# Patient Record
Sex: FEMALE | Race: WHITE | URBAN - METROPOLITAN AREA
[De-identification: names, ages, dates, MRNs, and addresses within clinical notes are randomized per-mention and may not be internally consistent; named-entity substitution may affect disease eponyms.]

---

## 2021-08-28 ENCOUNTER — TELEPHONE ENCOUNTER (OUTPATIENT)
Dept: URBAN - METROPOLITAN AREA CLINIC 13 | Facility: CLINIC | Age: 41
End: 2021-08-28

## 2021-08-29 ENCOUNTER — TELEPHONE ENCOUNTER (OUTPATIENT)
Dept: URBAN - METROPOLITAN AREA CLINIC 13 | Facility: CLINIC | Age: 41
End: 2021-08-29

## 2021-08-29 RX ORDER — LANSOPRAZOLE 30 MG/1
CAPSULE, DELAYED RELEASE ORAL
Status: ACTIVE | COMMUNITY
Start: 2018-07-30

## 2024-07-31 ENCOUNTER — DASHBOARD ENCOUNTERS (OUTPATIENT)
Age: 44
End: 2024-07-31

## 2024-07-31 PROBLEM — 453861000124107: Status: ACTIVE | Noted: 2024-07-31

## 2024-07-31 PROBLEM — 127026004: Status: ACTIVE | Noted: 2024-07-31

## 2024-07-31 PROBLEM — 443913008: Status: ACTIVE | Noted: 2024-07-31

## 2024-07-31 PROBLEM — 37429009: Status: ACTIVE | Noted: 2024-07-31

## 2024-07-31 PROBLEM — 429281008: Status: ACTIVE | Noted: 2024-07-31

## 2024-07-31 PROBLEM — 424263008: Status: ACTIVE | Noted: 2024-07-31

## 2024-08-01 ENCOUNTER — LAB OUTSIDE AN ENCOUNTER (OUTPATIENT)
Dept: URBAN - METROPOLITAN AREA CLINIC 86 | Facility: CLINIC | Age: 44
End: 2024-08-01

## 2024-08-01 ENCOUNTER — OFFICE VISIT (OUTPATIENT)
Dept: URBAN - METROPOLITAN AREA CLINIC 86 | Facility: CLINIC | Age: 44
End: 2024-08-01
Payer: COMMERCIAL

## 2024-08-01 VITALS
HEIGHT: 60 IN | SYSTOLIC BLOOD PRESSURE: 122 MMHG | WEIGHT: 145 LBS | BODY MASS INDEX: 28.47 KG/M2 | DIASTOLIC BLOOD PRESSURE: 73 MMHG | TEMPERATURE: 97.2 F | HEART RATE: 82 BPM

## 2024-08-01 DIAGNOSIS — Z79.899 HIGH RISK MEDICATION USE: ICD-10-CM

## 2024-08-01 DIAGNOSIS — D49.7 PINEAL TUMOR: ICD-10-CM

## 2024-08-01 DIAGNOSIS — D13.4 HEPATIC ADENOMA: ICD-10-CM

## 2024-08-01 DIAGNOSIS — Z90.49 HISTORY OF RESECTION OF LIVER: ICD-10-CM

## 2024-08-01 PROBLEM — 238131007: Status: ACTIVE | Noted: 2024-08-01

## 2024-08-01 PROCEDURE — 99205 OFFICE O/P NEW HI 60 MIN: CPT

## 2024-08-01 PROCEDURE — 99245 OFF/OP CONSLTJ NEW/EST HI 55: CPT

## 2024-08-01 RX ORDER — LANSOPRAZOLE 30 MG/1
CAPSULE, DELAYED RELEASE ORAL
Status: ACTIVE | COMMUNITY
Start: 2018-07-30

## 2024-08-01 RX ORDER — LEVOTHYROXINE SODIUM 50 UG/1
1 TABLET IN THE MORNING ON AN EMPTY STOMACH CAPSULE ORAL ONCE A DAY
Status: ACTIVE | COMMUNITY

## 2024-08-01 RX ORDER — LIOTHYRONINE SODIUM 5 UG/1
1 TABLET ON AN EMPTY STOMACH TABLET ORAL ONCE A DAY
Status: ACTIVE | COMMUNITY

## 2024-08-01 RX ORDER — DOCUSATE SODIUM 100 MG/1
1 CAPSULE AS NEEDED CAPSULE, LIQUID FILLED ORAL ONCE A DAY
Status: ON HOLD | COMMUNITY

## 2024-08-01 RX ORDER — SUMATRIPTAN SUCCINATE 25 MG/1
1 TABLET AT LEAST 2 HOURS BETWEEN DOSES AS NEEDED TABLET, FILM COATED ORAL TWICE A DAY
Status: ACTIVE | COMMUNITY

## 2024-08-01 RX ORDER — SOMATROPIN 6 MG
AS DIRECTED KIT INTRAMUSCULAR; SUBCUTANEOUS
Status: ACTIVE | COMMUNITY

## 2024-08-01 RX ORDER — MONTELUKAST SODIUM 10 MG/1
1 TABLET TABLET, FILM COATED ORAL ONCE A DAY
Status: ACTIVE | COMMUNITY

## 2024-08-01 NOTE — EXAM-PHYSICAL EXAM
Gen: awake and responsive.  Eyes: anicteric, normal lids.  Mouth: mask on  Nose: maskon  Hearing: intact grossly.  Neck: trachea midline and no jvd.  CV: RRR no s3.  Lungs: clear. No wheezes.  Abd: Soft, nabs, nr,NT. No hsm.  Ext: no sig edema,some palm erythema.  Neuro: moves all 4 ext

## 2024-08-01 NOTE — HPI-TODAY'S VISIT:
Patient is a 43-year-old female coming in being referred in by Evelyne Freire for evaluation of liver issues.  A copy the note be sent to referring providers.  Pt was prior being seen by [facility] and when went back to follow up told was not planning tx and she lost to follow up and not clear of options.  Told that they the [facility] office was not seeing her and the plan follow with iR team and be referred back then.  That mayhave led to the misinetrpretation.  Recap:   Patient listed as having been seen in the past at [place] for history of liver adenomas, hypothyroidism, and anterior pituitary disorders.  Patient listed as having had a pineal gland resection 1993, left lateral segment 3 on October 2018, partial right hepatectomy at that same time as well as cholecystectomy. Embolization of the liver done on August 13, 2021.  No tx since and has done scans with er as needed and if has had a gi illness ad hospital admits.  Last scan was June 6 2023 [facility] in [place], [place]:  postop changes in the region of the left lobe and 1 cm hypodense lesion with peripheral nodular enhancement in the posterior right los of the liver and hemangioma and additional subcentimter hypodensitiy is right lobe of the liver which is too small to characterize and 2.5 x1.5cm hypodense collectin in the subhepatic spce and gb absent.  Subcentimeter hypodensity right kidney and too small to characterize and pancreas spleen and adrenals and left kidney unremarkale.  Tickenign vs underdistension of the stomach, Mulltiple colonic diverticula without diverticulitis.  They recomeded to hepatic protocol  Medicines for hormonal therapy. Some allergy meds. prevacid.  Allergies to metformin, Dilantin, glutens, Macrobid, Trileptal, shellfish, ankle.  Social history not a smoker not a drinker.  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  She has hormone deficiencies and unfortunately has a history of hepatic adenomas.  She has been seen in 2021 for follow-up as she had a history of left lateral segmentectomy as well as partial right lobe resection and cholecystectomy at [facility] 2018 or 2019.  Plan embolization on August 13, 2021.  Labs at that timeframe showed a potassium of 4.3, glucose 91, BUN of 14 creatinine 0.95 albumin 4.0 bilirubin 0.3 AST was 15 ALT 14 alk phos 124 WBC 9.2 hemoglobin 13.5 plate count 285 INR 1.0.  They felt at that point present when Zhanna CAR saw her that intervention radiology reviewed her scan and they plan to see if follow-up imaging in 6 months and reassess her.  It does not appear that that occurred.  We also saw a note from [place] liver tumor conference from Dr. Gomez who saw her back on September 24, 2021 at a conference and they had an MRI from September 21, 2021 that they reviewed with the patient having a post embolic change of segment 7 adenomas which remained stable or slightly decreased in size with stable scattered other arterial enhancing nodules  Patient is known diagnosis of adenomas.  The overall close she had a good response to segment 7 and a plan embolization with internal close of adenomatous and they recommended surveillance again in 6 months from that conference.  The MRI dated September 21, 2021 was also reviewed.  FINDINGS:   Lower Thorax: Normal.  Liver: Fat deposition. Redemonstrated postoperative changes affecting the left  hepatic lobe. Redemonstrated diffuse peripherally enhancing hepatic nodular  lesions with T2 intermediate signal and no restricted diffusion for example:  Dominant Segment 7, peripherally enhancing lesion measuring 3.4 x 3.4 cm (13:39)  compared to 3.9 x 4.6 cm in previous study.  Segment 7/8 peripherally enhancing lesion measuring 1.1 x 1.1 cm compared to 2.0  x 2.0 cm on previous study  Segment 8, peripherally enhancing 2.2 x 2.5 cm lesion in the hepatic dome  compared to 2.3 x 2.3 cm in previous study (11:21).  The above lesions show evidence of central necrosis and some blood products in  the superior lesion, probably postembolization changes. Other lesions display  more homogenous arterial enhancement as they did on prior study.  Gallbladder/Biliary Tree: Gallbladder not seen. No intra or extrahepatic biliary  duct dilation  Spleen: Normal.  Pancreas: Normal.  Adrenal Glands: Normal.   Kidneys/Ureters: Renal cysts.  Gastrointestinal: Normal.   Lymph Nodes: Normal.  Vessels: Normal.  Peritoneum/Retroperitoneum: Normal.  Bones/Soft Tissues: Postsurgical changes from hepatic resection in the midline  abdominal wall.  IMPRESSION:      1.  Post embolic changes of segment 7 adenomas which remain stable or are  slightly decreased in size. Stable scattered other arterially enhancing nodules  compatible with patient's known diagnosis of adenomas.  2.  Hepatic steatosis  The images were reviewed and interpreted by Mary Espinal MD   Plan  1.  Patient needs labs CBC CMP PT/INR now to see how doing. 2.  Patient needs MRI eovist ASAP to assess her liver status and get the IR to review once that is done. 3.  Will ask interventional radiology to follow-up with patient as she has lost to follow-up with them once has the mri. 4. Key is she needs to avoid oral contraceptives and be monitored as she approaches the menopausal state with her other hormonal issues.  Duration of visit was 80 minutes with 40 minutes spent prepping chart and loading info for the visit to [facility] records and then additional 40 minutes spent for this face to face visit reviewing chart and events and plans with the pt and family.

## 2024-08-03 ENCOUNTER — TELEPHONE ENCOUNTER (OUTPATIENT)
Dept: URBAN - METROPOLITAN AREA CLINIC 86 | Facility: CLINIC | Age: 44
End: 2024-08-03

## 2024-08-03 LAB
A/G RATIO: 1.6
ABSOLUTE BASOPHILS: 84
ABSOLUTE EOSINOPHILS: 410
ABSOLUTE LYMPHOCYTES: 2409
ABSOLUTE MONOCYTES: 464
ABSOLUTE NEUTROPHILS: 4233
ALBUMIN: 4.2
ALKALINE PHOSPHATASE: 101
ALT (SGPT): 11
AST (SGOT): 11
BASOPHILS: 1.1
BILIRUBIN, TOTAL: 0.3
BUN/CREATININE RATIO: (no result)
BUN: 13
CALCIUM: 9.7
CARBON DIOXIDE, TOTAL: 23
CHLORIDE: 102
CREATININE: 0.91
EGFR: 80
EOSINOPHILS: 5.4
GLOBULIN, TOTAL: 2.6
GLUCOSE: 82
HEMATOCRIT: 45.3
HEMOGLOBIN: 14.4
HEPATITIS A AB, TOTAL: (no result)
HEPATITIS B CORE AB TOTAL: (no result)
HEPATITIS B SURFACE AB IMMUNITY, QN: <5
HEPATITIS B SURFACE ANTIGEN: (no result)
HEPATITIS C ANTIBODY: (no result)
INR: 0.9
LYMPHOCYTES: 31.7
MCH: 28.1
MCHC: 31.8
MCV: 88.3
MONOCYTES: 6.1
MPV: 10.7
NEUTROPHILS: 55.7
PLATELET COUNT: 287
POTASSIUM: 4.4
PROTEIN, TOTAL: 6.8
PT: 9.8
RDW: 13.1
RED BLOOD CELL COUNT: 5.13
SODIUM: 138
WHITE BLOOD CELL COUNT: 7.6

## 2024-08-03 NOTE — HPI-TODAY'S VISIT:
Dear Jasmin Foster, Aug 1 and hepatitis B surface ab less than 5 and not immune and need to look at vaccine options to get protected for this. Glucose 82 and bun 13 and cr 0.91 and na 138 and k 4.4 and cl 102 and co23 and ca 9.7 and total protein 6.8 and albumin 4.2 and tb 0.3 and alk 101 and ast 11 and alt 11. Wbc 7.6 and hg 14.4 and hct 45.3 which was a little up. Mcv 88.3 normal. Platelets 287 normal. RBC little up 5.13. Wonder if due to being a little on "dry" side that day? Neutrophils 4233 and lymphs 2409.  Inr 0.9 and Hepatitis b core ab total not reactive. Hepatitis A immunity not seen and could consider getting hepatitis A and B vaccine series to get immunity against both. please review with primary. Hepatitis B surface antigen is not reactive. Hepatitis C ab is not reactive and good to confirm. Good to see labs are stable for you. We await the mri now and we will then ask IR to follow up. Dr East

## 2024-09-16 ENCOUNTER — TELEPHONE ENCOUNTER (OUTPATIENT)
Dept: URBAN - METROPOLITAN AREA CLINIC 86 | Facility: CLINIC | Age: 44
End: 2024-09-16

## 2024-09-16 NOTE — HPI-TODAY'S VISIT:
Dear Jasmin Foster,  September 16 MRI back.  They compared it to your September 2021 MRI.  Lower thorax appeared normal.  They saw post surgical changes from your left hepatic segmentectomy and partial right hepatic lobe resection that  were stable.  Unchanged scarring also seen along the right inferior hepatic convexity.  They saw post embolic changes within segment 7 of the liver with interval decrease in size and partial collapse of the treatment cavity of the treated lesions in segment 7 which now measures 1.7 x 1.4 cm and previously 3.1 x 3.0 cm.   Additional stable to slightly increase scattered lesions were seen without the hepatic parenchyma which demonstrate similar enhancement without evidence of washout or definite fat signal in the in and out of phase imaging.  For example, in segment 4-a Neupogen of 2.3 x 2.3 cm lesion that was previously 2.1 x 1.7 in segment 5 due to 0.7 cm lesion previously 0.4.  Prior cholecystectomy changes were seen.  The spleen, pancreas, and adrenal glands were normal.  Kidney showed unchanged subcentimeter right renal cyst with no hydronephrosis.  Colon diverticulosis was seen without inflammation.  No evidence of any bowel obstruction.  No pathologic enlarged lymph nodes were seen.  Portal vein was patent.  Partially seen ventriculoperitoneal shunt was seen.  Trace ascites versus CSF was seen.  In summary, they were finding consistent with the known hepatic adenomas with a background of hepatic fatty liver changes.  There was mild interval increase in size of a few hepatic adenomas but no definite new lesions.  They continues to see evolving post embolic changes in segment 7 with interval decrease in size of multiple treated lesions and partial collapse of treatment cavities several which demonstrate some mild persistent peripheral enhancement.  It would be important to do the scan again in 6 months.  We will plan to follow along with the IR team and with your primary and see how these do over time.  We will discuss this again at your October 4 visit.  Dr. East

## 2024-09-19 ENCOUNTER — OFFICE VISIT (OUTPATIENT)
Dept: URBAN - METROPOLITAN AREA CLINIC 86 | Facility: CLINIC | Age: 44
End: 2024-09-19

## 2024-11-26 ENCOUNTER — WEB ENCOUNTER (OUTPATIENT)
Dept: URBAN - METROPOLITAN AREA CLINIC 86 | Facility: CLINIC | Age: 44
End: 2024-11-26

## 2024-12-04 ENCOUNTER — OFFICE VISIT (OUTPATIENT)
Dept: URBAN - METROPOLITAN AREA CLINIC 86 | Facility: CLINIC | Age: 44
End: 2024-12-04
Payer: SELF-PAY

## 2024-12-04 VITALS
BODY MASS INDEX: 27.88 KG/M2 | SYSTOLIC BLOOD PRESSURE: 123 MMHG | HEART RATE: 89 BPM | HEIGHT: 60 IN | WEIGHT: 142 LBS | TEMPERATURE: 97.2 F | DIASTOLIC BLOOD PRESSURE: 88 MMHG

## 2024-12-04 DIAGNOSIS — E66.3 OVERWEIGHT: ICD-10-CM

## 2024-12-04 DIAGNOSIS — E03.8 HYPOTHALAMIC HYPOTHYROIDISM: ICD-10-CM

## 2024-12-04 DIAGNOSIS — Z90.49 HISTORY OF RESECTION OF LIVER: ICD-10-CM

## 2024-12-04 DIAGNOSIS — D13.4 HEPATIC ADENOMA: ICD-10-CM

## 2024-12-04 DIAGNOSIS — Z71.85 VACCINE COUNSELING: ICD-10-CM

## 2024-12-04 DIAGNOSIS — Z79.899 HIGH RISK MEDICATION USE: ICD-10-CM

## 2024-12-04 DIAGNOSIS — D49.7 PINEAL TUMOR: ICD-10-CM

## 2024-12-04 PROCEDURE — 99215 OFFICE O/P EST HI 40 MIN: CPT

## 2024-12-04 RX ORDER — LANSOPRAZOLE 30 MG/1
CAPSULE, DELAYED RELEASE ORAL
Status: ACTIVE | COMMUNITY
Start: 2018-07-30

## 2024-12-04 RX ORDER — SOMATROPIN 6 MG
AS DIRECTED KIT INTRAMUSCULAR; SUBCUTANEOUS
Status: ACTIVE | COMMUNITY

## 2024-12-04 RX ORDER — SUMATRIPTAN SUCCINATE 25 MG/1
1 TABLET AT LEAST 2 HOURS BETWEEN DOSES AS NEEDED TABLET, FILM COATED ORAL TWICE A DAY
Status: ACTIVE | COMMUNITY

## 2024-12-04 RX ORDER — LEVOTHYROXINE SODIUM 50 UG/1
1 TABLET IN THE MORNING ON AN EMPTY STOMACH CAPSULE ORAL ONCE A DAY
Status: ACTIVE | COMMUNITY

## 2024-12-04 RX ORDER — DOCUSATE SODIUM 100 MG/1
1 CAPSULE AS NEEDED CAPSULE, LIQUID FILLED ORAL ONCE A DAY
Status: ON HOLD | COMMUNITY

## 2024-12-04 RX ORDER — ESTRADIOL 0.1 MG/G
AS DIRECTED CREAM VAGINAL
Status: ACTIVE | COMMUNITY

## 2024-12-04 RX ORDER — MONTELUKAST SODIUM 10 MG/1
1 TABLET TABLET, FILM COATED ORAL ONCE A DAY
Status: ACTIVE | COMMUNITY

## 2024-12-04 RX ORDER — LIOTHYRONINE SODIUM 5 UG/1
1 TABLET ON AN EMPTY STOMACH TABLET ORAL ONCE A DAY
Status: ACTIVE | COMMUNITY

## 2024-12-04 NOTE — HPI-TODAY'S VISIT:
Patient is a 44-year-old female seen aug 2024 and now coming in being referred in by Evelyne Freire for evaluation of liver issues.  A copy the note be sent to referring providers.  September 16 MRI back. They compared it to your September 2021 MRI. Lower thorax appeared normal. They saw post surgical changes from your left hepatic segmentectomy and partial right hepatic lobe resection that  were stable.  Unchanged scarring also seen along the right inferior hepatic convexity. They saw post embolic changes within segment 7 of the liver with interval decrease in size and partial collapse of the treatment cavity of the treated lesions in segment 7 which now measures 1.7 x 1.4 cm and previously 3.1 x 3.0 cm.  Additional stable to slightly increase scattered lesions were seen without the hepatic parenchyma which demonstrate similar enhancement without evidence of washout or definite fat signal in the in and out of phase imaging.  For example, in segment 4-a Neupogen of 2.3 x 2.3 cm lesion that was previously 2.1 x 1.7 in segment 5 due to 0.7 cm lesion previously 0.4.  Prior cholecystectomy changes were seen.  The spleen, pancreas, and adrenal glands were normal.  Kidney showed unchanged subcentimeter right renal cyst with no hydronephrosis.  Colon diverticulosis was seen without inflammation.  No evidence of any bowel obstruction.  No pathologic enlarged lymph nodes were seen.  Portal vein was patent.  Partially seen ventriculoperitoneal shunt was seen.  Trace ascites versus CSF was seen.  In summary, they were finding consistent with the known hepatic adenomas with a background of hepatic fatty liver changes.  There was mild interval increase in size of a few hepatic adenomas but no definite new lesions.  They continues to see evolving post embolic changes in segment 7 with interval decrease in size of multiple treated lesions and partial collapse of treatment cavities several which demonstrate some mild persistent peripheral enhancement.  It would be important to do the scan again in 6 months.  We will plan to follow along with the IR team and with your primary and see how these do over time.  We need to set up for the mri in march.   Aug 1 and hepatitis B surface ab less than 5 and not immune and need to look at vaccine options to get protected for this. Glucose 82 and bun 13 and cr 0.91 and na 138 and k 4.4 and cl 102 and co23 and ca 9.7 and total protein 6.8 and albumin 4.2 and tb 0.3 and alk 101 and ast 11 and alt 11. Wbc 7.6 and hg 14.4 and hct 45.3 which was a little up. Mcv 88.3 normal. Platelets 287 normal. RBC little up 5.13. Wonder if due to being a little on "dry" side that day? Neutrophils 4233 and lymphs 2409. Inr 0.9 and Hepatitis b core ab total not reactive. Hepatitis A immunity not seen and could consider getting hepatitis A and B vaccine series to get immunity against both. please review with primary. Hepatitis B surface antigen is not reactive. Hepatitis C ab is not reactive and good to confirm.  Good to see labs are stable for you. We await the mri now and we will then ask IR to follow up.  Pt was prior being seen by [facility] and when went back to follow up told was not planning tx and she lost to follow up and not clear of options.  Told that they the [facility] office was not seeing her and the plan follow with iR team and be referred back then.  That mayhave led to the misinetrpretation.  Recap:   Patient listed as having been seen in the past at [facility] for history of liver adenomas, hypothyroidism, and anterior pituitary disorders.  Patient listed as having had a pineal gland resection 1993, left lateral segment 3 on October 2018, partial right hepatectomy at that same time as well as cholecystectomy. Embolization of the liver done on August 13, 2021.  No tx since and has done scans with er as needed and if has had a gi illness ad hospital admits.  Last scan was June 6 2023 [facility] in [place], [place]:  postop changes in the region of the left lobe and 1 cm hypodense lesion with peripheral nodular enhancement in the posterior right los of the liver and hemangioma and additional subcentimter hypodensitiy is right lobe of the liver which is too small to characterize and 2.5 x1.5cm hypodense collectin in the subhepatic spce and gb absent.  Subcentimeter hypodensity right kidney and too small to characterize and pancreas spleen and adrenals and left kidney unremarkale.  Tickenign vs underdistension of the stomach, Mulltiple colonic diverticula without diverticulitis.  They recomeded to hepatic protocol  Medicines for hormonal therapy. Some allergy meds. prevacid.  Allergies to metformin, Dilantin, glutens, Macrobid, Trileptal, shellfish, ankle.  Social history not a smoker not a drinker.  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  She has hormone deficiencies and unfortunately has a history of hepatic adenomas.  She has been seen in 2021 for follow-up as she had a history of left lateral segmentectomy as well as partial right lobe resection and cholecystectomy at [facility] 2018 or 2019.  Plan embolization on August 13, 2021.  Labs at that timeframe showed a potassium of 4.3, glucose 91, BUN of 14 creatinine 0.95 albumin 4.0 bilirubin 0.3 AST was 15 ALT 14 alk phos 124 WBC 9.2 hemoglobin 13.5 plate count 285 INR 1.0.  They felt at that point present when Zhanna CAR saw her that intervention radiology reviewed her scan and they plan to see if follow-up imaging in 6 months and reassess her.  It does not appear that that occurred.  We also saw a note from [facility] liver tumor conference from Dr. Gomez who saw her back on September 24, 2021 at a conference and they had an MRI from September 21, 2021 that they reviewed with the patient having a post embolic change of segment 7 adenomas which remained stable or slightly decreased in size with stable scattered other arterial enhancing nodules  Patient is known diagnosis of adenomas.  The overall close she had a good response to segment 7 and a plan embolization with internal close of adenomatous and they recommended surveillance again in 6 months from that conference.  The MRI dated September 21, 2021 was also reviewed.  FINDINGS:   Lower Thorax: Normal.  Liver: Fat deposition. Redemonstrated postoperative changes affecting the left  hepatic lobe. Redemonstrated diffuse peripherally enhancing hepatic nodular  lesions with T2 intermediate signal and no restricted diffusion for example:  Dominant Segment 7, peripherally enhancing lesion measuring 3.4 x 3.4 cm (13:39)  compared to 3.9 x 4.6 cm in previous study.  Segment 7/8 peripherally enhancing lesion measuring 1.1 x 1.1 cm compared to 2.0  x 2.0 cm on previous study  Segment 8, peripherally enhancing 2.2 x 2.5 cm lesion in the hepatic dome  compared to 2.3 x 2.3 cm in previous study (11:21).  The above lesions show evidence of central necrosis and some blood products in  the superior lesion, probably postembolization changes. Other lesions display  more homogenous arterial enhancement as they did on prior study.  Gallbladder/Biliary Tree: Gallbladder not seen. No intra or extrahepatic biliary  duct dilation  Spleen: Normal.  Pancreas: Normal.  Adrenal Glands: Normal.   Kidneys/Ureters: Renal cysts.  Gastrointestinal: Normal.   Lymph Nodes: Normal.  Vessels: Normal.  Peritoneum/Retroperitoneum: Normal.  Bones/Soft Tissues: Postsurgical changes from hepatic resection in the midline  abdominal wall.  IMPRESSION:      1.  Post embolic changes of segment 7 adenomas which remain stable or are  slightly decreased in size. Stable scattered other arterially enhancing nodules  compatible with patient's known diagnosis of adenomas.  2.  Hepatic steatosis  The images were reviewed and interpreted by Mary Espinal MD  Having some headaches with the switch to cream hormone and using some imitrex for this and not a lot of data of toxicity from this.   Plan  1.  Patient needs labs in march. 2. Plan for the eovist mri in march. Switched to estradiol cream and having headaches on cream. Says may need imitrex pills to help with this. 3. If the lesions change then we can reassess. 4. Keep following and refer as needed.    Duration of visit was 53 minutes with 20 minutes spent prepping chart and loading info for the visit to [facility] records and then additional 33 minutes spent for this face to face visit reviewing chart and events and plans with the pt and family.

## 2025-01-23 ENCOUNTER — WEB ENCOUNTER (OUTPATIENT)
Dept: URBAN - METROPOLITAN AREA CLINIC 86 | Facility: CLINIC | Age: 45
End: 2025-01-23

## 2025-03-01 ENCOUNTER — LAB OUTSIDE AN ENCOUNTER (OUTPATIENT)
Dept: URBAN - METROPOLITAN AREA CLINIC 86 | Facility: CLINIC | Age: 45
End: 2025-03-01

## 2025-03-05 ENCOUNTER — LAB OUTSIDE AN ENCOUNTER (OUTPATIENT)
Dept: URBAN - METROPOLITAN AREA CLINIC 86 | Facility: CLINIC | Age: 45
End: 2025-03-05

## 2025-03-15 ENCOUNTER — TELEPHONE ENCOUNTER (OUTPATIENT)
Dept: URBAN - METROPOLITAN AREA CLINIC 86 | Facility: CLINIC | Age: 45
End: 2025-03-15

## 2025-03-15 LAB
A/G RATIO: 1.8
ABSOLUTE BASOPHILS: 88
ABSOLUTE EOSINOPHILS: 744
ABSOLUTE LYMPHOCYTES: 2032
ABSOLUTE MONOCYTES: 472
ABSOLUTE NEUTROPHILS: 4664
ALBUMIN: 4.4
ALKALINE PHOSPHATASE: 182
ALT (SGPT): 19
AST (SGOT): 18
BASOPHILS: 1.1
BILIRUBIN, TOTAL: 0.4
BUN/CREATININE RATIO: 20
BUN: 20
CALCIUM: 9.9
CARBON DIOXIDE, TOTAL: 26
CHLORIDE: 106
CREATININE: 1.01
EGFR: 70
EOSINOPHILS: 9.3
GLOBULIN, TOTAL: 2.4
GLUCOSE: 114
HEMATOCRIT: 40.5
HEMOGLOBIN: 13.3
LYMPHOCYTES: 25.4
MCH: 28.5
MCHC: 32.8
MCV: 86.9
MONOCYTES: 5.9
MPV: 11
NEUTROPHILS: 58.3
PLATELET COUNT: 265
POTASSIUM: 4.4
PROTEIN, TOTAL: 6.8
RDW: 13.1
RED BLOOD CELL COUNT: 4.66
SODIUM: 143
WHITE BLOOD CELL COUNT: 8

## 2025-03-15 NOTE — HPI-TODAY'S VISIT:
Dear Jasmin Foster,  March 14 labs show glucose elevated 114 and unclear if you were fasting for the labs?  Please share with local providers.  BUN was 20 and creatinine slightly up at 1.01 and previously 0.91.  Sodium 143 potassium 4.4 calcium 9.9 albumin 4.4 bilirubin 0.4 alk phos 182 which was elevated and previously in August of last year had been normal at 101.  AST was normal at 18 ALT of 19 with ideal ALT less than 25 but both were higher than in August 2024 when the AST was 11 ALT 11.  WBC normal at 8 hemoglobin normal at 13.3 MCV normal at 86.9 and platelet count normal at 265 normal neutrophils 4664 and lymphocytes 2032.  Monocytes normal at 472 curiously her eosinophils are up at 744 and unclear if related due to the recent pollen increases that we are all seeing causing you to have some allergies?  I remember that you notified us back in January that unfortunately you had sustained a stroke and so I wonder if you are on some new medicines or some new issues that could be affecting your labs?  Please let us know what medicines you are taking to see if there is any medicine that could be causing the side effects?  You have an appointment coming up on March 28 we will review that also with you then.  Dr. East

## 2025-03-24 ENCOUNTER — TELEPHONE ENCOUNTER (OUTPATIENT)
Dept: URBAN - METROPOLITAN AREA CLINIC 86 | Facility: CLINIC | Age: 45
End: 2025-03-24

## 2025-03-24 NOTE — HPI-TODAY'S VISIT:
Dear Jasmin Foster,   March 14 MRI was read out and released today.   Lower thorax appeared normal.   Liver had fat deposition and postsurgical changes from your left hepatic segmentectomy and partial right hepatic lobe resection.  Postembolization changes seen in segment 7 with continued decrease in size of the treatment cavity measuring 1.3 x 1.2 cm from 1.7 x 1.4 cm in September 2024.   Unchanged to slightly decreased size of multiple other lesions throughout the liver without washout which include:   -Unchanged to minimally decreased segment 4A lesion measures 2.3 x 2.1 cm (13:35), previously 2.3 x 2.3 cm.   -Decreased segment 5 lesion measuring 1.3 x 0.6 cm (13:50), previously 1.8 x 1.1 cm.   -Decreased segment 7 lesion measuring 1.6 x 0.9 cm (13:33), previously 1.3 x 1.8 cm   Gallbladder/biliary tree showed postcholecystectomy changes.   Spleen, pancreas, adrenal glands normal.   Kidneys showed some renal cysts.   Colon views showed diverticulosis but without inflammation.   Lymph nodes were normal.   Vessels showed no aneurysmal dilation and patent portal vasculature.   No aggressive osseous lesions seen.    In summary, yet stable to slightly decreased size of the multiple hepatic arterial enhancing lesions consistent with your known hepatic adenomatosis.  Your status post segment 7 embolization with decreased size of the treatment cavity noted as listed above.  Liver did appear to be fatty but they did not give a fat amount.  Important to keep working on that as well.   Dr. East

## 2025-03-28 ENCOUNTER — OFFICE VISIT (OUTPATIENT)
Dept: URBAN - METROPOLITAN AREA TELEHEALTH 2 | Facility: TELEHEALTH | Age: 45
End: 2025-03-28
Payer: SELF-PAY

## 2025-03-28 ENCOUNTER — OFFICE VISIT (OUTPATIENT)
Dept: URBAN - METROPOLITAN AREA CLINIC 86 | Facility: CLINIC | Age: 45
End: 2025-03-28

## 2025-03-28 DIAGNOSIS — D13.4 HEPATIC ADENOMA: ICD-10-CM

## 2025-03-28 DIAGNOSIS — I67.89 CVA: ICD-10-CM

## 2025-03-28 DIAGNOSIS — Z71.85 VACCINE COUNSELING: ICD-10-CM

## 2025-03-28 DIAGNOSIS — Z79.899 HIGH RISK MEDICATION USE: ICD-10-CM

## 2025-03-28 DIAGNOSIS — Q21.12 PATENT FORAMEN OVALE: ICD-10-CM

## 2025-03-28 DIAGNOSIS — E03.8 HYPOTHALAMIC HYPOTHYROIDISM: ICD-10-CM

## 2025-03-28 DIAGNOSIS — E66.3 OVERWEIGHT: ICD-10-CM

## 2025-03-28 DIAGNOSIS — Z90.49 HISTORY OF RESECTION OF LIVER: ICD-10-CM

## 2025-03-28 DIAGNOSIS — R73.09 ELEVATED GLUCOSE: ICD-10-CM

## 2025-03-28 DIAGNOSIS — K76.0 FATTY LIVER: ICD-10-CM

## 2025-03-28 DIAGNOSIS — D49.7 PINEAL TUMOR: ICD-10-CM

## 2025-03-28 PROCEDURE — 99215 OFFICE O/P EST HI 40 MIN: CPT

## 2025-03-28 RX ORDER — SUMATRIPTAN SUCCINATE 25 MG/1
1 TABLET AT LEAST 2 HOURS BETWEEN DOSES AS NEEDED TABLET, FILM COATED ORAL TWICE A DAY
Status: ACTIVE | COMMUNITY

## 2025-03-28 RX ORDER — MONTELUKAST SODIUM 10 MG/1
1 TABLET TABLET, FILM COATED ORAL ONCE A DAY
Status: ACTIVE | COMMUNITY

## 2025-03-28 RX ORDER — LIOTHYRONINE SODIUM 5 UG/1
1 TABLET ON AN EMPTY STOMACH TABLET ORAL ONCE A DAY
Status: ACTIVE | COMMUNITY

## 2025-03-28 RX ORDER — LANSOPRAZOLE 30 MG/1
CAPSULE, DELAYED RELEASE ORAL
Status: ACTIVE | COMMUNITY
Start: 2018-07-30

## 2025-03-28 RX ORDER — ESTRADIOL 0.1 MG/G
AS DIRECTED CREAM VAGINAL
Status: ACTIVE | COMMUNITY

## 2025-03-28 RX ORDER — SOMATROPIN 6 MG
AS DIRECTED KIT INTRAMUSCULAR; SUBCUTANEOUS
Status: ON HOLD | COMMUNITY

## 2025-03-28 RX ORDER — DOCUSATE SODIUM 100 MG/1
1 CAPSULE AS NEEDED CAPSULE, LIQUID FILLED ORAL ONCE A DAY
Status: ON HOLD | COMMUNITY

## 2025-03-28 RX ORDER — LEVOTHYROXINE SODIUM 50 UG/1
1 TABLET IN THE MORNING ON AN EMPTY STOMACH CAPSULE ORAL ONCE A DAY
Status: ACTIVE | COMMUNITY

## 2025-03-28 NOTE — EXAM-PHYSICAL EXAM
Gen: no distress.  Eyes: no jaundice.  Mouth: no thrush.  CV: no chest pain.  Resp: no wheezes.  Abd: no pain.  Ext: no edema.  Neuro: left hand last to catch up from cva and less so arms.
IV discontinued, cath removed intact

## 2025-03-28 NOTE — HPI-TODAY'S VISIT:
Patient is a 44-year-old female seen Dec 2024 and now coming in being referred in by Evelyne Freire for evaluation of liver issues.  A copy the note be sent to referring providers.  March 14 MRI Lower thorax appeared normal. Liver had fat deposition and postsurgical changes from your left hepatic segmentectomy and partial right hepatic lobe resection.  Postembolization changes seen in segment 7 with continued decrease in size of the treatment cavity measuring 1.3 x 1.2 cm from 1.7 x 1.4 cm in September 2024. Unchanged to slightly decreased size of multiple other lesions throughout the liver without washout which include: -Unchanged to minimally decreased segment 4A lesion measures 2.3 x 2.1 cm (13:35), previously 2.3 x 2.3 cm. -Decreased segment 5 lesion measuring 1.3 x 0.6 cm (13:50), previously 1.8 x 1.1 cm. -Decreased segment 7 lesion measuring 1.6 x 0.9 cm (13:33), previously 1.3 x 1.8 cm Gallbladder/biliary tree showed postcholecystectomy changes. Spleen, pancreas, adrenal glands normal. Kidneys showed some renal cysts. Colon views showed diverticulosis but without inflammation. Lymph nodes were normal. Vessels showed no aneurysmal dilation and patent portal vasculature. No aggressive osseous lesions seen.  In summary, yet stable to slightly decreased size of the multiple hepatic arterial enhancing lesions consistent with your known hepatic adenomatosis.  Your status post segment 7 embolization with decreased size of the treatment cavity noted as listed above.  Liver did appear to be fatty but they did not give a fat amount.  Important to keep working on that as well.  March 14 labs show glucose elevated 114 and unclear if you were fasting for the labs? Please share with local providers. Asked re a1c and having issues for her sugars and trying to appeal for meds. BUN was 20 and creatinine slightly up at 1.01 and previously 0.91. Sodium 143 potassium 4.4 calcium 9.9 albumin 4.4 bilirubin 0.4 alk phos 182 which was elevated and previously in August of last year had been normal at 101. AST was normal at 18 ALT of 19 with ideal ALT less than 25 but both were higher than in August 2024 when the AST was 11 ALT 11. WBC normal at 8 hemoglobin normal at 13.3 MCV normal at 86.9 and platelet count normal at 265 normal neutrophils 4664 and lymphocytes 2032. Monocytes normal at 472 curiously her eosinophils are up at 744 and unclear if related due to the recent pollen increases that we are all seeing causing you to have some allergies?   September 16 2024 MRI back. They compared it to your September 2021 MRI. Lower thorax appeared normal. They saw post surgical changes from your left hepatic segmentectomy and partial right hepatic lobe resection that  were stable.  Unchanged scarring also seen along the right inferior hepatic convexity. They saw post embolic changes within segment 7 of the liver with interval decrease in size and partial collapse of the treatment cavity of the treated lesions in segment 7 which now measures 1.7 x 1.4 cm and previously 3.1 x 3.0 cm. Additional stable to slightly increase scattered lesions were seen without the hepatic parenchyma which demonstrate similar enhancement without evidence of washout or definite fat signal in the in and out of phase imaging.  For example, in segment 4-a Neupogen of 2.3 x 2.3 cm lesion that was previously 2.1 x 1.7 in segment 5 due to 0.7 cm lesion previously 0.4. Prior cholecystectomy changes were seen. The spleen, pancreas, and adrenal glands were normal. Kidney showed unchanged subcentimeter right renal cyst with no hydronephrosis. Colon diverticulosis was seen without inflammation.  No evidence of any bowel obstruction. No pathologic enlarged lymph nodes were seen. Portal vein was patent. Partially seen ventriculoperitoneal shunt was seen.  Trace ascites versus CSF was seen. In summary, they were finding consistent with the known hepatic adenomas with a background of hepatic fatty liver changes.  There was mild interval increase in size of a few hepatic adenomas but no definite new lesions.  They continues to see evolving post embolic changes in segment 7 with interval decrease in size of multiple treated lesions and partial collapse of treatment cavities several which demonstrate some mild persistent peripheral enhancement.  On hormonal tx since age 19 and she has been off the meds for 1m. She estrogen gel now vs tablet.  Aug 1 and hepatitis B surface ab less than 5 and not immune and need to look at vaccine options to get protected for this. Glucose 82 and bun 13 and cr 0.91 and na 138 and k 4.4 and cl 102 and co23 and ca 9.7 and total protein 6.8 and albumin 4.2 and tb 0.3 and alk 101 and ast 11 and alt 11. Wbc 7.6 and hg 14.4 and hct 45.3 which was a little up. Mcv 88.3 normal. Platelets 287 normal. RBC little up 5.13. Wonder if due to being a little on "dry" side that day? Neutrophils 4233 and lymphs 2409. Inr 0.9 and Hepatitis b core ab total not reactive. Hepatitis A immunity not seen and could consider getting hepatitis A and B vaccine series to get immunity against both. please review with primary. Hepatitis B surface antigen is not reactive. Hepatitis C ab is not reactive and good to confirm.  Good to see labs are stable for you. We await the mri now and we will then ask IR to follow up.  Pt was prior being seen by [facility] and when went back to follow up told was not planning tx and she lost to follow up and not clear of options.  Told that they the [facility] office was not seeing her and the plan follow with iR team and be referred back then.  That mayhave led to the misinetrpretation.  Recap:   Patient listed as having been seen in the past at [facility] for history of liver adenomas, hypothyroidism, and anterior pituitary disorders.  Patient listed as having had a pineal gland resection 1993, left lateral segment 3 on October 2018, partial right hepatectomy at that same time as well as cholecystectomy. Embolization of the liver done on August 13, 2021.  No tx since and has done scans with er as needed and if has had a gi illness ad hospital admits.  Last scan was June 6 2023 [facility] in [place], [place]:  postop changes in the region of the left lobe and 1 cm hypodense lesion with peripheral nodular enhancement in the posterior right los of the liver and hemangioma and additional subcentimter hypodensitiy is right lobe of the liver which is too small to characterize and 2.5 x1.5cm hypodense collectin in the subhepatic spce and gb absent.  Subcentimeter hypodensity right kidney and too small to characterize and pancreas spleen and adrenals and left kidney unremarkale.  Tickenign vs underdistension of the stomach, Mulltiple colonic diverticula without diverticulitis.  They recomeded to hepatic protocol  Medicines for hormonal therapy. Some allergy meds. prevacid.  Allergies to metformin, Dilantin, glutens, Macrobid, Trileptal, shellfish, ankle.  Social history not a smoker not a drinker.  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  She has hormone deficiencies and unfortunately has a history of hepatic adenomas.  She has been seen in 2021 for follow-up as she had a history of left lateral segmentectomy as well as partial right lobe resection and cholecystectomy at [facility] 2018 or 2019.  Plan embolization on August 13, 2021.  Labs at that timeframe showed a potassium of 4.3, glucose 91, BUN of 14 creatinine 0.95 albumin 4.0 bilirubin 0.3 AST was 15 ALT 14 alk phos 124 WBC 9.2 hemoglobin 13.5 plate count 285 INR 1.0.  They felt at that point present when Zhanna CAR saw her that intervention radiology reviewed her scan and they plan to see if follow-up imaging in 6 months and reassess her.  It does not appear that that occurred.  We also saw a note from [facility] conference from Dr. Gomez who saw her back on September 24, 2021 at a conference and they had an MRI from September 21, 2021 that they reviewed with the patient having a post embolic change of segment 7 adenomas which remained stable or slightly decreased in size with stable scattered other arterial enhancing nodules  Patient is known diagnosis of adenomas.  The overall close she had a good response to segment 7 and a plan embolization with internal close of adenomatous and they recommended surveillance again in 6 months from that conference.  The MRI dated September 21, 2021 was also reviewed.  FINDINGS:   Lower Thorax: Normal.  Liver: Fat deposition. Redemonstrated postoperative changes affecting the left  hepatic lobe. Redemonstrated diffuse peripherally enhancing hepatic nodular  lesions with T2 intermediate signal and no restricted diffusion for example:  Dominant Segment 7, peripherally enhancing lesion measuring 3.4 x 3.4 cm (13:39)  compared to 3.9 x 4.6 cm in previous study.  Segment 7/8 peripherally enhancing lesion measuring 1.1 x 1.1 cm compared to 2.0  x 2.0 cm on previous study  Segment 8, peripherally enhancing 2.2 x 2.5 cm lesion in the hepatic dome  compared to 2.3 x 2.3 cm in previous study (11:21).  The above lesions show evidence of central necrosis and some blood products in  the superior lesion, probably postembolization changes. Other lesions display  more homogenous arterial enhancement as they did on prior study.  Gallbladder/Biliary Tree: Gallbladder not seen. No intra or extrahepatic biliary  duct dilation  Spleen: Normal.  Pancreas: Normal.  Adrenal Glands: Normal.   Kidneys/Ureters: Renal cysts.  Gastrointestinal: Normal.   Lymph Nodes: Normal.  Vessels: Normal.  Peritoneum/Retroperitoneum: Normal.  Bones/Soft Tissues: Postsurgical changes from hepatic resection in the midline  abdominal wall.  IMPRESSION:      1.  Post embolic changes of segment 7 adenomas which remain stable or are  slightly decreased in size. Stable scattered other arterially enhancing nodules  compatible with patient's known diagnosis of adenomas.  2.  Hepatic steatosis  The images were reviewed and interpreted by Mary Espinal MD  Having some headaches with the switch to cream hormone and using some imitrex for this and not a lot of data of toxicity from this.   Plan  1.  Patient needs mri in 6m and labs. 2. Plan for the eovist mri then. No headaches with gel now. 3. If the lesions change then we can reassess. 4. Keep following and refer as needed.    Duration of visit was 41  minutes with 20 minutes spent prepping chart and loading info for the visit to [facility] records and then additional 21 minutes spent for this [facility] telemed visit reviewing chart and events and plans with the pt .

## 2025-05-22 ENCOUNTER — WEB ENCOUNTER (OUTPATIENT)
Dept: URBAN - METROPOLITAN AREA CLINIC 92 | Facility: CLINIC | Age: 45
End: 2025-05-22

## 2025-06-29 ENCOUNTER — WEB ENCOUNTER (OUTPATIENT)
Dept: URBAN - METROPOLITAN AREA CLINIC 92 | Facility: CLINIC | Age: 45
End: 2025-06-29